# Patient Record
Sex: FEMALE | Race: WHITE
[De-identification: names, ages, dates, MRNs, and addresses within clinical notes are randomized per-mention and may not be internally consistent; named-entity substitution may affect disease eponyms.]

---

## 2019-10-29 ENCOUNTER — HOSPITAL ENCOUNTER (OUTPATIENT)
Dept: HOSPITAL 47 - RADCTMAIN | Age: 24
Discharge: HOME | End: 2019-10-29
Attending: PHYSICIAN ASSISTANT
Payer: COMMERCIAL

## 2019-10-29 DIAGNOSIS — R16.2: Primary | ICD-10-CM

## 2019-10-29 DIAGNOSIS — R59.0: ICD-10-CM

## 2019-10-29 DIAGNOSIS — R10.31: ICD-10-CM

## 2019-10-29 LAB
ALBUMIN SERPL-MCNC: 4.6 G/DL (ref 3.5–5)
ALP SERPL-CCNC: 55 U/L (ref 38–126)
ALT SERPL-CCNC: 36 U/L (ref 9–52)
ANION GAP SERPL CALC-SCNC: 7 MMOL/L
AST SERPL-CCNC: 29 U/L (ref 14–36)
BUN SERPL-SCNC: 11 MG/DL (ref 7–17)
CALCIUM SPEC-MCNC: 9.6 MG/DL (ref 8.4–10.2)
CHLORIDE SERPL-SCNC: 105 MMOL/L (ref 98–107)
CO2 SERPL-SCNC: 30 MMOL/L (ref 22–30)
ERYTHROCYTE [DISTWIDTH] IN BLOOD BY AUTOMATED COUNT: 4.75 M/UL (ref 3.8–5.4)
ERYTHROCYTE [DISTWIDTH] IN BLOOD: 11.6 % (ref 11.5–15.5)
GLUCOSE SERPL-MCNC: 95 MG/DL (ref 74–99)
HCT VFR BLD AUTO: 41.8 % (ref 34–46)
HGB BLD-MCNC: 14.7 GM/DL (ref 11.4–16)
MCH RBC QN AUTO: 31 PG (ref 25–35)
MCHC RBC AUTO-ENTMCNC: 35.2 G/DL (ref 31–37)
MCV RBC AUTO: 88 FL (ref 80–100)
PLATELET # BLD AUTO: 366 K/UL (ref 150–450)
POTASSIUM SERPL-SCNC: 4.7 MMOL/L (ref 3.5–5.1)
PROT SERPL-MCNC: 7.4 G/DL (ref 6.3–8.2)
SODIUM SERPL-SCNC: 142 MMOL/L (ref 137–145)
WBC # BLD AUTO: 7.6 K/UL (ref 3.8–10.6)

## 2019-10-29 PROCEDURE — 74177 CT ABD & PELVIS W/CONTRAST: CPT

## 2019-10-29 PROCEDURE — 80053 COMPREHEN METABOLIC PANEL: CPT

## 2019-10-29 PROCEDURE — 36415 COLL VENOUS BLD VENIPUNCTURE: CPT

## 2019-10-29 PROCEDURE — 85027 COMPLETE CBC AUTOMATED: CPT

## 2019-10-29 NOTE — CT
EXAMINATION TYPE: CT abdomen pelvis w con

 

DATE OF EXAM: 10/29/2019

 

COMPARISON: NONE

 

HISTORY: 24-year-old female RLQ pain

 

TECHNIQUE: Contiguous axial scanning of the abdomen and pelvis following administration of 100 ml Iso
pedro 300 IV contrast.  Delayed images through the kidneys and coronal/sagittal reconstructions perform
ed.

 

CT DLP: 1137.5 mGycm

Automated exposure control for dose reduction was used.

 

 

FINDINGS:

Heart upper limits of normal in size without pericardial effusion. Lung bases clear without pleural e
ffusion.

 

Liver borderline enlarged at 17.8 cm. No focal lesion. Portal venous system is patent. No biliary rosemarie
jeff dilatation.

 

Gallbladder, adrenal glands, kidneys, and pancreas appear within normal limits. Spleen borderline enl
arged at 13.5 cm with hilar splenule.

 

No dilated small bowel, free fluid, or free air. No mesenteric or retroperitoneal lymphadenopathy.

 

Oral contrast progressed to the hepatic flexure. Mild stool burden. No pericolonic inflammatory porter
e.

 

Normal appendix.

 

Couple borderline to mildly enlarged right lower quadrant mesenteric lymph nodes measuring up to 9 mm
, refer to coronal image 36 and 37.

 

Bladder is urine distended. Uterus anteverted. Both ovaries are visualized. Trace right cul-de-sac fr
ee fluid likely physiologic. No pelvic lymphadenopathy.

 

Bones: Mild degenerative disc disease L5-S1.

 

 

 

IMPRESSION: 

 

1. BORDERLINE HEPATOSPLENOMEGALY (LIVER 17.8 CM AND SPLEEN 13.5 CM).

2. NORMAL APPENDIX.

3. A FEW BORDERLINE TO MILDLY ENLARGED RIGHT LOWER QUADRANT MESENTERIC LYMPH NODES MAY BE SEEN WITH M
ESENTERIC ADENITIS. CLINICALLY CORRELATE.

4. TRACE CUL-DE-SAC FREE FLUID LIKELY PHYSIOLOGIC.